# Patient Record
Sex: FEMALE | Race: BLACK OR AFRICAN AMERICAN | NOT HISPANIC OR LATINO | URBAN - METROPOLITAN AREA
[De-identification: names, ages, dates, MRNs, and addresses within clinical notes are randomized per-mention and may not be internally consistent; named-entity substitution may affect disease eponyms.]

---

## 2023-11-30 ENCOUNTER — OFFICE (OUTPATIENT)
Dept: URBAN - METROPOLITAN AREA CLINIC 9 | Facility: CLINIC | Age: 23
End: 2023-11-30

## 2023-11-30 VITALS
HEART RATE: 62 BPM | WEIGHT: 139 LBS | OXYGEN SATURATION: 99 % | DIASTOLIC BLOOD PRESSURE: 75 MMHG | SYSTOLIC BLOOD PRESSURE: 110 MMHG | HEIGHT: 64 IN

## 2023-11-30 DIAGNOSIS — R10.84 GENERALIZED ABDOMINAL PAIN: ICD-10-CM

## 2023-11-30 DIAGNOSIS — R68.81 EARLY SATIETY: ICD-10-CM

## 2023-11-30 PROCEDURE — 99203 OFFICE O/P NEW LOW 30 MIN: CPT

## 2023-11-30 NOTE — SERVICEHPINOTES
Ms. Jenkins presents today with generalized abdominal pain that started about 1 year ago after giving birth (vaginal delivery). She describes the pain as intermittent and usually lasts about 5 minutes. She denies nausea but does report one episode of vomiting that was accompanied by excruciating abdominal pain. She has also noticed a sensation of getting mayo quicker. She denies any difficulty swallowing or acid reflux. She has a formed stool daily but will notice episodes of diarrhea following abdominal pain. No rectal bleeding or melena.

## 2023-12-02 LAB
C-REACTIVE PROTEIN, QUANT: <1 MG/L
CBC WITH DIFFERENTIAL/PLATELET: BASO (ABSOLUTE): 0 X10E3/UL (ref 0–0.2)
CBC WITH DIFFERENTIAL/PLATELET: BASOS: 0 %
CBC WITH DIFFERENTIAL/PLATELET: EOS (ABSOLUTE): 0.2 X10E3/UL (ref 0–0.4)
CBC WITH DIFFERENTIAL/PLATELET: EOS: 3 %
CBC WITH DIFFERENTIAL/PLATELET: HEMATOCRIT: 37 % (ref 34–46.6)
CBC WITH DIFFERENTIAL/PLATELET: HEMOGLOBIN: 11.9 G/DL (ref 11.1–15.9)
CBC WITH DIFFERENTIAL/PLATELET: IMMATURE GRANS (ABS): 0 X10E3/UL (ref 0–0.1)
CBC WITH DIFFERENTIAL/PLATELET: IMMATURE GRANULOCYTES: 0 %
CBC WITH DIFFERENTIAL/PLATELET: LYMPHS (ABSOLUTE): 2.2 X10E3/UL (ref 0.7–3.1)
CBC WITH DIFFERENTIAL/PLATELET: LYMPHS: 36 %
CBC WITH DIFFERENTIAL/PLATELET: MCH: 28.2 PG (ref 26.6–33)
CBC WITH DIFFERENTIAL/PLATELET: MCHC: 32.2 G/DL (ref 31.5–35.7)
CBC WITH DIFFERENTIAL/PLATELET: MCV: 88 FL (ref 79–97)
CBC WITH DIFFERENTIAL/PLATELET: MONOCYTES(ABSOLUTE): 0.4 X10E3/UL (ref 0.1–0.9)
CBC WITH DIFFERENTIAL/PLATELET: MONOCYTES: 6 %
CBC WITH DIFFERENTIAL/PLATELET: NEUTROPHILS (ABSOLUTE): 3.4 X10E3/UL (ref 1.4–7)
CBC WITH DIFFERENTIAL/PLATELET: NEUTROPHILS: 55 %
CBC WITH DIFFERENTIAL/PLATELET: PLATELETS: 271 X10E3/UL (ref 150–450)
CBC WITH DIFFERENTIAL/PLATELET: RBC: 4.22 X10E6/UL (ref 3.77–5.28)
CBC WITH DIFFERENTIAL/PLATELET: RDW: 11.9 % (ref 11.7–15.4)
CBC WITH DIFFERENTIAL/PLATELET: WBC: 6.2 X10E3/UL (ref 3.4–10.8)
COMP. METABOLIC PANEL (14): A/G RATIO: 2.1 (ref 1.2–2.2)
COMP. METABOLIC PANEL (14): ALBUMIN: 4.4 G/DL (ref 4–5)
COMP. METABOLIC PANEL (14): ALKALINE PHOSPHATASE: 76 IU/L (ref 44–121)
COMP. METABOLIC PANEL (14): ALT (SGPT): 16 IU/L (ref 0–32)
COMP. METABOLIC PANEL (14): AST (SGOT): 20 IU/L (ref 0–40)
COMP. METABOLIC PANEL (14): BILIRUBIN, TOTAL: 0.2 MG/DL (ref 0–1.2)
COMP. METABOLIC PANEL (14): BUN/CREATININE RATIO: 9 (ref 9–23)
COMP. METABOLIC PANEL (14): BUN: 6 MG/DL (ref 6–20)
COMP. METABOLIC PANEL (14): CALCIUM: 9.1 MG/DL (ref 8.7–10.2)
COMP. METABOLIC PANEL (14): CARBON DIOXIDE, TOTAL: 27 MMOL/L (ref 20–29)
COMP. METABOLIC PANEL (14): CHLORIDE: 103 MMOL/L (ref 96–106)
COMP. METABOLIC PANEL (14): CREATININE: 0.65 MG/DL (ref 0.57–1)
COMP. METABOLIC PANEL (14): EGFR: 125 ML/MIN/1.73 (ref 59–?)
COMP. METABOLIC PANEL (14): GLOBULIN, TOTAL: 2.1 G/DL (ref 1.5–4.5)
COMP. METABOLIC PANEL (14): GLUCOSE: 74 MG/DL (ref 70–99)
COMP. METABOLIC PANEL (14): POTASSIUM: 3.6 MMOL/L (ref 3.5–5.2)
COMP. METABOLIC PANEL (14): PROTEIN, TOTAL: 6.5 G/DL (ref 6–8.5)
COMP. METABOLIC PANEL (14): SODIUM: 142 MMOL/L (ref 134–144)
H PYLORI BREATH TEST: NEGATIVE
SEDIMENTATION RATE-WESTERGREN: 2 MM/HR (ref 0–32)
T-TRANSGLUTAMINASE (TTG) IGA: <2 U/ML

## 2023-12-07 LAB — COLOFIT,OCCULT BLOOD,FECAL,IA: OCCULT BLOOD, FECAL, IA: NEGATIVE

## 2023-12-27 ENCOUNTER — OFFICE (OUTPATIENT)
Dept: URBAN - METROPOLITAN AREA CLINIC 9 | Facility: CLINIC | Age: 23
End: 2023-12-27

## 2023-12-27 VITALS
HEART RATE: 66 BPM | HEIGHT: 64 IN | DIASTOLIC BLOOD PRESSURE: 69 MMHG | WEIGHT: 138 LBS | RESPIRATION RATE: 14 BRPM | SYSTOLIC BLOOD PRESSURE: 105 MMHG | OXYGEN SATURATION: 98 %

## 2023-12-27 DIAGNOSIS — R68.81 EARLY SATIETY: ICD-10-CM

## 2023-12-27 DIAGNOSIS — R10.84 GENERALIZED ABDOMINAL PAIN: ICD-10-CM

## 2023-12-27 PROCEDURE — 99213 OFFICE O/P EST LOW 20 MIN: CPT
